# Patient Record
Sex: FEMALE | Race: WHITE | NOT HISPANIC OR LATINO | Employment: FULL TIME | URBAN - METROPOLITAN AREA
[De-identification: names, ages, dates, MRNs, and addresses within clinical notes are randomized per-mention and may not be internally consistent; named-entity substitution may affect disease eponyms.]

---

## 2017-05-04 ENCOUNTER — ALLSCRIPTS OFFICE VISIT (OUTPATIENT)
Dept: OTHER | Facility: OTHER | Age: 37
End: 2017-05-04

## 2018-01-11 NOTE — PROGRESS NOTES
Chief Complaint  patient here for PPD need it for her work  see immunization flow sheet  patient tolerated well      Active Problems    1  Elevated serum alkaline phosphatase level (790 5) (R74 8)   2  Encounter for cervical Pap smear with pelvic exam (V76 2,V72 31) (Z01 419)   3  Graves' disease (242 00) (E05 00)    Current Meds   1  No Reported Medications Recorded    Allergies    1   No Known Drug Allergies    Plan  PPD screening test    · Tubersol 5 UNIT/0 1ML Intradermal Solution    Future Appointments    Date/Time Provider Specialty Site   02/23/2017 01:00 PM Radha Galarza 57     Signatures   Electronically signed by : OLIMPIA Chauhan ; Sep  6 2016 12:42PM EST                       (Author)

## 2018-01-12 NOTE — PROGRESS NOTES
Chief Complaint  pt here for PPD reading  reading negative, 0mm      Active Problems    1  Elevated serum alkaline phosphatase level (790 5) (R74 8)   2  Encounter for cervical Pap smear with pelvic exam (V76 2,V72 31) (Z01 419)   3  Graves' disease (242 00) (E05 00)   4  PPD screening test (V74 1) (Z11 1)    Current Meds   1  No Reported Medications Recorded    Allergies    1   No Known Drug Allergies    Plan  PPD screening test    · Tubersol 5 UNIT/0 1ML Intradermal Solution    Future Appointments    Date/Time Provider Specialty Site   02/23/2017 01:00 PM Radha Shahid 57     Signatures   Electronically signed by : OLIMPIA Kowalski ; Sep  8 2016 10:48AM EST                       (Author)

## 2018-01-15 VITALS
RESPIRATION RATE: 16 BRPM | TEMPERATURE: 98.3 F | HEART RATE: 92 BPM | HEIGHT: 68 IN | SYSTOLIC BLOOD PRESSURE: 100 MMHG | DIASTOLIC BLOOD PRESSURE: 70 MMHG | WEIGHT: 160 LBS | OXYGEN SATURATION: 99 % | BODY MASS INDEX: 24.25 KG/M2

## 2018-05-15 NOTE — PROGRESS NOTES
Assessment/Plan:    No problem-specific Assessment & Plan notes found for this encounter  Diagnoses and all orders for this visit:    Graves disease  -     TSH, 3rd generation; Future  -     T4, free; Future  -     T3, free; Future  -     HEMOGLOBIN A1C W/ EAG ESTIMATION; Future  -     Lipid Panel with Direct LDL reflex; Future  -     Comprehensive metabolic panel; Future  -     US thyroid; Future  -     Thyroid Antibodies Panel; Future    Fatigue, unspecified type  -     TSH, 3rd generation; Future  -     T4, free; Future  -     T3, free; Future  -     HEMOGLOBIN A1C W/ EAG ESTIMATION; Future  -     Lipid Panel with Direct LDL reflex; Future  -     Comprehensive metabolic panel; Future  -     Thyroid Antibodies Panel; Future    Hair thinning  -     TSH, 3rd generation; Future  -     T4, free; Future  -     T3, free; Future  -     HEMOGLOBIN A1C W/ EAG ESTIMATION; Future  -     Lipid Panel with Direct LDL reflex; Future  -     Comprehensive metabolic panel; Future  -     Thyroid Antibodies Panel; Future        Concerns for hypothyroidism  Get labs and thyroid US, will call with results when available  Subjective:   Pt here for an acute visit  Last health maintenance 2016  Pt says for about two months she has noticed some weight gain, very dry hair, feeling a little trouble concentrating can get words out  Patient ID: Heath Gonzales is a 30 y o  female  HPI   Patient presents because for last 2 months has been feeling tired, has noticed 6 pounds weight gain despite her increasing her exercise and eating healthy, her hair has been thinning falling off and more dry she also has noted difficulty concentrating and occasionally finding words     She has previous history of Graves disease particularly during her pregnancies was on treatment but after her 2nd pregnancy this was discontinued and she was stable without treatment she was following up with endocrinologist Dr Luma Stratton, but was discharge once her numbers were stable without treatment for a while  Last time checked labs was 2016 and stable TSH per patient  Normal menses  Denies any palpitations, shortness of breath, changes in bowel movements  Her mother has hypothyroidism  The following portions of the patient's history were reviewed and updated as appropriate: allergies, current medications, past family history, past medical history, past social history, past surgical history and problem list     Review of Systems   Constitutional: Negative for activity change and appetite change  Respiratory: Negative  Cardiovascular: Negative  Gastrointestinal: Negative  Genitourinary: Negative  Musculoskeletal: Negative for arthralgias  Skin: Negative for rash  Psychiatric/Behavioral: Negative  Objective:      /88   Pulse 75   Temp 99 1 °F (37 3 °C)   Resp 20   Ht 5' 8" (1 727 m)   Wt 76 2 kg (168 lb)   LMP 05/09/2018 (Approximate)   SpO2 99%   BMI 25 54 kg/m²          Physical Exam   Constitutional: She is oriented to person, place, and time  She appears well-developed and well-nourished  No distress  HENT:   Head: Normocephalic  Right Ear: External ear normal    Left Ear: External ear normal    Mouth/Throat: Oropharynx is clear and moist    Eyes: Conjunctivae and EOM are normal    Neck: Thyromegaly (slightly enlarged R>L, no nodules palpable) present  Cardiovascular: Normal rate, regular rhythm and normal heart sounds  Pulmonary/Chest: Effort normal and breath sounds normal  No respiratory distress  She has no wheezes  She has no rales  Abdominal: Soft  Bowel sounds are normal  There is no tenderness  Musculoskeletal: Normal range of motion  Lymphadenopathy:     She has no cervical adenopathy  Neurological: She is alert and oriented to person, place, and time  No cranial nerve deficit  Psychiatric: She has a normal mood and affect   Her behavior is normal

## 2018-05-16 ENCOUNTER — OFFICE VISIT (OUTPATIENT)
Dept: FAMILY MEDICINE CLINIC | Facility: CLINIC | Age: 38
End: 2018-05-16
Payer: COMMERCIAL

## 2018-05-16 VITALS
TEMPERATURE: 99.1 F | DIASTOLIC BLOOD PRESSURE: 88 MMHG | HEIGHT: 68 IN | BODY MASS INDEX: 25.46 KG/M2 | SYSTOLIC BLOOD PRESSURE: 128 MMHG | HEART RATE: 75 BPM | WEIGHT: 168 LBS | RESPIRATION RATE: 20 BRPM | OXYGEN SATURATION: 99 %

## 2018-05-16 DIAGNOSIS — L65.9 HAIR THINNING: ICD-10-CM

## 2018-05-16 DIAGNOSIS — E05.00 GRAVES DISEASE: Primary | ICD-10-CM

## 2018-05-16 DIAGNOSIS — R53.83 FATIGUE, UNSPECIFIED TYPE: ICD-10-CM

## 2018-05-16 PROBLEM — J30.9 ALLERGIC RHINITIS: Status: ACTIVE | Noted: 2017-05-04

## 2018-05-16 PROCEDURE — 3008F BODY MASS INDEX DOCD: CPT | Performed by: FAMILY MEDICINE

## 2018-05-16 PROCEDURE — 99214 OFFICE O/P EST MOD 30 MIN: CPT | Performed by: FAMILY MEDICINE

## 2018-05-17 ENCOUNTER — HOSPITAL ENCOUNTER (OUTPATIENT)
Dept: RADIOLOGY | Age: 38
Discharge: HOME | End: 2018-05-17
Payer: COMMERCIAL

## 2018-05-17 DIAGNOSIS — E05.00 GRAVES DISEASE: ICD-10-CM

## 2018-05-17 PROCEDURE — 76536 US EXAM OF HEAD AND NECK: CPT

## 2018-05-18 LAB
ALBUMIN SERPL-MCNC: 4.3 G/DL (ref 3.6–5.1)
ALBUMIN/GLOB SERPL: 1.4 (CALC) (ref 1–2.5)
ALP SERPL-CCNC: 47 U/L (ref 33–115)
ALT SERPL-CCNC: 18 U/L (ref 6–29)
AST SERPL-CCNC: 20 U/L (ref 10–30)
BILIRUB SERPL-MCNC: 0.6 MG/DL (ref 0.2–1.2)
BUN SERPL-MCNC: 16 MG/DL (ref 7–25)
BUN/CREAT SERPL: NORMAL (CALC) (ref 6–22)
CALCIUM SERPL-MCNC: 9.1 MG/DL (ref 8.6–10.2)
CHLORIDE SERPL-SCNC: 106 MMOL/L (ref 98–110)
CHOLEST SERPL-MCNC: 163 MG/DL
CHOLEST/HDLC SERPL: 3.2 (CALC)
CO2 SERPL-SCNC: 27 MMOL/L (ref 20–31)
CREAT SERPL-MCNC: 1.07 MG/DL (ref 0.5–1.1)
EST. AVERAGE GLUCOSE BLD GHB EST-MCNC: 100 (CALC)
EST. AVERAGE GLUCOSE BLD GHB EST-SCNC: 5.5 (CALC)
GLOBULIN SER CALC-MCNC: 3 G/DL (CALC) (ref 1.9–3.7)
GLUCOSE SERPL-MCNC: 81 MG/DL (ref 65–99)
HBA1C MFR BLD: 5.1 % OF TOTAL HGB
HDLC SERPL-MCNC: 51 MG/DL
LDLC SERPL CALC-MCNC: 89 MG/DL (CALC)
NONHDLC SERPL-MCNC: 112 MG/DL (CALC)
POTASSIUM SERPL-SCNC: 4.3 MMOL/L (ref 3.5–5.3)
PROT SERPL-MCNC: 7.3 G/DL (ref 6.1–8.1)
SL AMB EGFR AFRICAN AMERICAN: 78 ML/MIN/1.73M2
SL AMB EGFR NON AFRICAN AMERICAN: 67 ML/MIN/1.73M2
SODIUM SERPL-SCNC: 139 MMOL/L (ref 135–146)
T3FREE SERPL-MCNC: 3 PG/ML (ref 2.3–4.2)
T4 FREE SERPL-MCNC: 1.3 NG/DL (ref 0.8–1.8)
THYROGLOB AB SERPL-ACNC: 2 IU/ML
THYROPEROXIDASE AB SERPL-ACNC: 172 IU/ML
TRIGL SERPL-MCNC: 133 MG/DL
TSH SERPL-ACNC: 1.6 MIU/L

## 2020-03-16 ENCOUNTER — TELEPHONE (OUTPATIENT)
Dept: FAMILY MEDICINE CLINIC | Facility: CLINIC | Age: 40
End: 2020-03-16

## 2020-03-24 ENCOUNTER — TELEPHONE (OUTPATIENT)
Dept: FAMILY MEDICINE CLINIC | Facility: CLINIC | Age: 40
End: 2020-03-24

## 2020-09-30 ENCOUNTER — NURSE TRIAGE (OUTPATIENT)
Dept: OTHER | Facility: OTHER | Age: 40
End: 2020-09-30

## 2020-09-30 DIAGNOSIS — Z20.828 EXPOSURE TO SARS-ASSOCIATED CORONAVIRUS: Primary | ICD-10-CM

## 2020-09-30 DIAGNOSIS — Z20.828 EXPOSURE TO SARS-ASSOCIATED CORONAVIRUS: ICD-10-CM

## 2020-09-30 PROCEDURE — U0003 INFECTIOUS AGENT DETECTION BY NUCLEIC ACID (DNA OR RNA); SEVERE ACUTE RESPIRATORY SYNDROME CORONAVIRUS 2 (SARS-COV-2) (CORONAVIRUS DISEASE [COVID-19]), AMPLIFIED PROBE TECHNIQUE, MAKING USE OF HIGH THROUGHPUT TECHNOLOGIES AS DESCRIBED BY CMS-2020-01-R: HCPCS | Performed by: FAMILY MEDICINE

## 2020-09-30 NOTE — TELEPHONE ENCOUNTER
Regarding: Covid Symptomatic  ----- Message from John Cisneros sent at 9/30/2020  8:55 AM EDT -----  " Patient is experiencing a cough and congestion "

## 2020-09-30 NOTE — TELEPHONE ENCOUNTER
Pt is requesting a covid test and does not have a [facility] PCP   Order placed for test   Pt informed of closest testing site and was advised of hours of operation, address, to wear a mask, and to stay in the car   Pt verbalized understanding       Pt already has a [employer] account to check for results  Advised to begin checking in 2-3 days after testing is completed

## 2020-09-30 NOTE — TELEPHONE ENCOUNTER
Reason for Disposition   COVID-19 Testing, questions about    Answer Assessment - Initial Assessment Questions  1  COVID-19 DIAGNOSIS: "Who made your Coronavirus (COVID-19) diagnosis?" "Was it confirmed by a positive lab test?" If not diagnosed by a HCP, ask "Are there lots of cases (community spread) where you live?" (See public health department website, if unsure)      Not diagnosed yet; [facility] resident  2  ONSET: "When did the COVID-19 symptoms start?"       2 days ago  3  WORST SYMPTOM: "What is your worst symptom?" (e g , cough, fever, shortness of breath, muscle aches)      congestion  4  COUGH: "Do you have a cough?" If so, ask: "How bad is the cough?"        Yes, mild  5  FEVER: "Do you have a fever?" If so, ask: "What is your temperature, how was it measured, and when did it start?"      denies  6  RESPIRATORY STATUS: "Describe your breathing?" (e g , shortness of breath, wheezing, unable to speak)       normal  7  BETTER-SAME-WORSE: "Are you getting better, staying the same or getting worse compared to yesterday?"  If getting worse, ask, "In what way?"      better  8  HIGH RISK DISEASE: "Do you have any chronic medical problems?" (e g , asthma, heart or lung disease, weak immune system, etc )      denies  9  PREGNANCY: "Is there any chance you are pregnant?" "When was your last menstrual period?"      Denies, LMP 9/15/20  10   OTHER SYMPTOMS: "Do you have any other symptoms?"  (e g , chills, fatigue, headache, loss of smell or taste, muscle pain, sore throat)       Decreased appetite, loss of smell, sinus pressure    Protocols used: CORONAVIRUS (COVID-19) DIAGNOSED OR SUSPECTED-ADULT-[place]

## 2020-10-01 LAB — SARS-COV-2 RNA SPEC QL NAA+PROBE: NOT DETECTED
